# Patient Record
Sex: MALE | Race: BLACK OR AFRICAN AMERICAN | NOT HISPANIC OR LATINO | Employment: OTHER | URBAN - METROPOLITAN AREA
[De-identification: names, ages, dates, MRNs, and addresses within clinical notes are randomized per-mention and may not be internally consistent; named-entity substitution may affect disease eponyms.]

---

## 2022-05-31 NOTE — PROGRESS NOTES
Chief Complaint: No chief complaint on file.      HPI:  Patient is a 71-year-old male referred to urology clinic for hesitancy, dysuria, frequent UTIs.  Patient treated by [facility] urgent care for UTI with microscopic hematuria.  Today patient presents without dysuria, urinary urgency, frequency, incontinence, retention, gross hematuria, nocturia.  Patient is currently on a Flomax to 0.4 mg p.o. daily.  Patient denies family history of urologic pathology, personal history of stones.     Plan:  PSA now, and UA with reflex.  Instructed patient on timed voiding every 2-3 hours, double voiding, avoidance of bladder irritants such as alcohol citrus foods & drinks, chocolates, caffeinated drinks, energy drinks, spicey foods, sodas, increase water intake.    Allergies:  Review of patient's allergies indicates:  Not on File    Medications:  No current outpatient medications on file.     No current facility-administered medications for this visit.       Review of Systems:  General: No fever, chills, fatigability, or weight loss.  Skin: No rashes, itching, or changes in color or texture of skin.  Chest: Denies HAGAN, cyanosis, wheezing, cough, and sputum production.  Abdomen: Appetite fine. No weight loss. Denies diarrhea, abdominal pain, hematemesis, or blood in stool.  Musculoskeletal: No joint stiffness or swelling. Denies back pain.  : As above.  All other review of systems negative.    PMH:  No past medical history on file.    PSH:  No past surgical history on file.    FamHx:  No family history on file.    SocHx:       Physical Exam:  There were no vitals filed for this visit.  General: A&Ox3, no apparent distress, no deformities  Neck: No masses, normal thyroid  Lungs: CTA richy, no use of accessory muscles  Heart: RRR, no arrhythmias  Abdomen: Soft, NT, ND, no masses, no hernias, no hepatosplenomegaly  Lymphatic: Neck and groin nodes negative  Skin: The skin is warm and dry. No jaundice.  Ext: No c/c/e.    GUMale: Test  desc richy, no abnormalities of epididymus. Penis circumcised, with normal penile and scrotal skin. Meatus normal. Normal rectal tone, no hemorrhoids. Prostate 1-1/2 finger breath wide, smooth, soft, nontender, no nodules or masses appreciated. SV not palpable. Perineum and anus normal.          Impression:  BPH, frequent UTIs      Plan: PSA now, and UA with reflex.  Instructed patient on timed voiding every 2-3 hours, double voiding, avoidance of bladder irritants such as alcohol citrus foods & drinks, chocolates, caffeinated drinks, energy drinks, spicey foods, sodas, increase water intake.

## 2022-06-01 ENCOUNTER — OFFICE VISIT (OUTPATIENT)
Dept: UROLOGY | Facility: CLINIC | Age: 72
End: 2022-06-01
Payer: OTHER GOVERNMENT

## 2022-06-01 ENCOUNTER — LAB VISIT (OUTPATIENT)
Dept: LAB | Facility: HOSPITAL | Age: 72
End: 2022-06-01
Attending: UROLOGY
Payer: OTHER GOVERNMENT

## 2022-06-01 DIAGNOSIS — N40.0 BENIGN PROSTATIC HYPERPLASIA WITHOUT LOWER URINARY TRACT SYMPTOMS: ICD-10-CM

## 2022-06-01 DIAGNOSIS — N39.0 FREQUENT UTI: Primary | ICD-10-CM

## 2022-06-01 DIAGNOSIS — N39.0 FREQUENT UTI: ICD-10-CM

## 2022-06-01 LAB
APPEARANCE UR: CLEAR
BACTERIA #/AREA URNS AUTO: NORMAL /HPF
BILIRUB UR QL STRIP.AUTO: NEGATIVE MG/DL
COLOR UR AUTO: COLORLESS
FREE/TOTAL PSA (OHS): 20.3 %
GLUCOSE UR QL STRIP.AUTO: NORMAL MG/DL
HYALINE CASTS #/AREA URNS LPF: NORMAL /LPF
KETONES UR QL STRIP.AUTO: NEGATIVE MG/DL
LEUKOCYTE ESTERASE UR QL STRIP.AUTO: NEGATIVE UNIT/L
NITRITE UR QL STRIP.AUTO: NEGATIVE
PH UR STRIP.AUTO: 6 [PH]
PROT UR QL STRIP.AUTO: NEGATIVE MG/DL
PSA FREE MFR SERPL: 20 %
PSA FREE SERPL-MCNC: 0.62 NG/ML
PSA SERPL-MCNC: 3.06 NG/ML
RBC #/AREA URNS AUTO: NORMAL /HPF
RBC UR QL AUTO: NEGATIVE UNIT/L
SP GR UR STRIP.AUTO: 1
SQUAMOUS #/AREA URNS LPF: NORMAL /HPF
UROBILINOGEN UR STRIP-ACNC: NORMAL MG/DL
WBC #/AREA URNS AUTO: NORMAL /HPF

## 2022-06-01 PROCEDURE — 84153 ASSAY OF PSA TOTAL: CPT

## 2022-06-01 PROCEDURE — 99204 PR OFFICE/OUTPT VISIT, NEW, LEVL IV, 45-59 MIN: ICD-10-PCS | Mod: S$PBB,,, | Performed by: NURSE PRACTITIONER

## 2022-06-01 PROCEDURE — 99204 OFFICE O/P NEW MOD 45 MIN: CPT | Mod: S$PBB,,, | Performed by: NURSE PRACTITIONER

## 2022-06-01 PROCEDURE — 36415 COLL VENOUS BLD VENIPUNCTURE: CPT

## 2022-06-01 PROCEDURE — 99214 OFFICE O/P EST MOD 30 MIN: CPT | Mod: PBBFAC | Performed by: NURSE PRACTITIONER

## 2022-06-01 PROCEDURE — 84154 ASSAY OF PSA FREE: CPT

## 2022-06-01 PROCEDURE — 81001 URINALYSIS AUTO W/SCOPE: CPT | Performed by: NURSE PRACTITIONER

## 2022-06-01 RX ORDER — CIPROFLOXACIN 500 MG/1
500 TABLET ORAL 2 TIMES DAILY
COMMUNITY
Start: 2022-03-10 | End: 2022-12-07 | Stop reason: CLARIF

## 2022-06-01 RX ORDER — DOXYCYCLINE 100 MG/1
100 CAPSULE ORAL 2 TIMES DAILY
COMMUNITY
Start: 2022-01-27 | End: 2022-12-07 | Stop reason: CLARIF

## 2022-06-01 RX ORDER — METRONIDAZOLE 500 MG/1
2000 TABLET ORAL DAILY
COMMUNITY
Start: 2022-02-01

## 2022-06-01 NOTE — PROGRESS NOTES
Placed in room. Seen by Angel Cnon NP. Spoke with patient. U/A collected and sent to lab. Will obtain PSA, with clinic appointment in 6 months.

## 2022-12-07 ENCOUNTER — OFFICE VISIT (OUTPATIENT)
Dept: UROLOGY | Facility: CLINIC | Age: 72
End: 2022-12-07
Payer: OTHER GOVERNMENT

## 2022-12-07 VITALS
SYSTOLIC BLOOD PRESSURE: 161 MMHG | BODY MASS INDEX: 24.13 KG/M2 | TEMPERATURE: 98 F | HEART RATE: 48 BPM | RESPIRATION RATE: 20 BRPM | DIASTOLIC BLOOD PRESSURE: 65 MMHG | HEIGHT: 72 IN | OXYGEN SATURATION: 100 % | WEIGHT: 178.13 LBS

## 2022-12-07 DIAGNOSIS — N40.0 BENIGN PROSTATIC HYPERPLASIA WITHOUT LOWER URINARY TRACT SYMPTOMS: Primary | ICD-10-CM

## 2022-12-07 PROCEDURE — 99214 OFFICE O/P EST MOD 30 MIN: CPT | Mod: PBBFAC | Performed by: NURSE PRACTITIONER

## 2022-12-07 PROCEDURE — 99213 OFFICE O/P EST LOW 20 MIN: CPT | Mod: S$PBB,,, | Performed by: NURSE PRACTITIONER

## 2022-12-07 PROCEDURE — 99213 PR OFFICE/OUTPT VISIT, EST, LEVL III, 20-29 MIN: ICD-10-PCS | Mod: S$PBB,,, | Performed by: NURSE PRACTITIONER

## 2022-12-07 RX ORDER — AMLODIPINE BESYLATE 10 MG/1
10 TABLET ORAL
COMMUNITY
Start: 2022-08-23

## 2022-12-07 RX ORDER — PANTOPRAZOLE SODIUM 40 MG/1
40 TABLET, DELAYED RELEASE ORAL
COMMUNITY
Start: 2022-08-23

## 2022-12-07 RX ORDER — ASPIRIN 325 MG
1 TABLET, DELAYED RELEASE (ENTERIC COATED) ORAL WEEKLY
COMMUNITY
Start: 2022-08-23

## 2022-12-07 RX ORDER — LOSARTAN POTASSIUM 100 MG/1
100 TABLET ORAL
COMMUNITY
Start: 2022-08-23

## 2022-12-07 RX ORDER — TIZANIDINE 4 MG/1
4 TABLET ORAL
COMMUNITY
Start: 2022-08-23

## 2022-12-07 NOTE — PROGRESS NOTES
Chief Complaint:   Chief Complaint   Patient presents with    BPH- UTI's       HPI: Patient is a 72-year-old male 6 month F/U BPH, hesitancy, dysuria, frequent UTIs. Last PSA 3.06 on 06/01/2022 Today patient denies dysuria, urinary urgency, frequency, incontinence, retention, gross hematuria, nocturia.           Allergies:  Review of patient's allergies indicates:   Allergen Reactions    Pcn [penicillins]        Medications:  Current Outpatient Medications   Medication Sig Dispense Refill    amLODIPine (NORVASC) 10 MG tablet 10 mg.      cholecalciferol, vitamin D3, 1,250 mcg (50,000 unit) capsule Take 1 capsule by mouth once a week.      pantoprazole (PROTONIX) 40 MG tablet 40 mg.      tiZANidine (ZANAFLEX) 4 MG tablet 4 mg.      ciprofloxacin HCl (CIPRO) 500 MG tablet Take 500 mg by mouth 2 (two) times daily.      doxycycline (VIBRAMYCIN) 100 MG Cap Take 100 mg by mouth 2 (two) times daily.      losartan (COZAAR) 100 MG tablet 100 mg.      metroNIDAZOLE (FLAGYL) 500 MG tablet Take 2,000 mg by mouth once daily.       No current facility-administered medications for this visit.       Review of Systems:  General: No fever, chills, fatigability, or weight loss.  Skin: No rashes, itching, or changes in color or texture of skin.  Chest: Denies HAGAN, cyanosis, wheezing, cough, and sputum production.  Abdomen: Appetite fine. No weight loss. Denies diarrhea, abdominal pain, hematemesis, or blood in stool.  Musculoskeletal: No joint stiffness or swelling. Denies back pain.  : As above.  All other review of systems negative.    PMH:  Past Medical History:   Diagnosis Date    Dysuria        PSH:  Past Surgical History:   Procedure Laterality Date    hemorroidectomy      HERNIA REPAIR      KNEE SURGERY         FamHx:  No family history on file.    SocHx:  Social History     Socioeconomic History    Marital status:    Tobacco Use    Smoking status: Never     Passive exposure: Current    Smokeless tobacco: Never    Substance and Sexual Activity    Alcohol use: Never    Drug use: Yes     Types: Marijuana    Sexual activity: Yes       Physical Exam:  Vitals:    12/07/22 1010   BP: (!) 161/65   Pulse: (!) 48   Resp: 20   Temp: 97.9 °F (36.6 °C)     General: A&Ox3, no apparent distress, no deformities  Neck: No masses, normal thyroid  Lungs: CTA richy, no use of accessory muscles  Heart: RRR, no arrhythmias  Abdomen: Soft, NT, ND, no masses, no hernias, no hepatosplenomegaly  Lymphatic: Neck and groin nodes negative  Skin: The skin is warm and dry. No jaundice.  Ext: No c/c/e.              Impression: BPH      Plan: Instructed patient PSA now & with RTC in 6 months, Patient instructed on timed voiding every 2-3 hours, double voiding, avoidance of bladder irritants such as alcohol citrus foods & drinks, chocolates, caffeinated drinks, energy drinks, spicy.

## 2023-06-07 ENCOUNTER — OFFICE VISIT (OUTPATIENT)
Dept: UROLOGY | Facility: CLINIC | Age: 73
End: 2023-06-07
Payer: OTHER GOVERNMENT

## 2023-06-07 VITALS
DIASTOLIC BLOOD PRESSURE: 70 MMHG | SYSTOLIC BLOOD PRESSURE: 131 MMHG | BODY MASS INDEX: 23.14 KG/M2 | RESPIRATION RATE: 18 BRPM | HEART RATE: 76 BPM | OXYGEN SATURATION: 98 % | WEIGHT: 170.81 LBS | HEIGHT: 72 IN

## 2023-06-07 DIAGNOSIS — N40.0 BENIGN PROSTATIC HYPERPLASIA WITHOUT LOWER URINARY TRACT SYMPTOMS: Primary | ICD-10-CM

## 2023-06-07 PROCEDURE — 99213 PR OFFICE/OUTPT VISIT, EST, LEVL III, 20-29 MIN: ICD-10-PCS | Mod: S$PBB,,, | Performed by: NURSE PRACTITIONER

## 2023-06-07 PROCEDURE — 99213 OFFICE O/P EST LOW 20 MIN: CPT | Mod: S$PBB,,, | Performed by: NURSE PRACTITIONER

## 2023-06-07 PROCEDURE — 99214 OFFICE O/P EST MOD 30 MIN: CPT | Mod: PBBFAC | Performed by: NURSE PRACTITIONER

## 2023-06-07 NOTE — PROGRESS NOTES
Chief Complaint:   Chief Complaint   Patient presents with    Follow-up     6 month follow up with PSA. Last visit 06/01/2022 BPH, Frequent UTIs        HPI: Patient is a 72-year-old male 6 month F/U BPH, hesitancy, dysuria, frequent UTIs. Last PSA 2.9 on 12/07/2022Patient urine stream, hesitancy, straining, interruption of stream, dribbling, frequency, nocturia. SEGUNDO: As described below. Family history of urologic pathology & personal history of stones.       .   Allergies:  Review of patient's allergies indicates:   Allergen Reactions    Pcn [penicillins]        Medications:  Current Outpatient Medications   Medication Sig Dispense Refill    amLODIPine (NORVASC) 10 MG tablet 10 mg.      cholecalciferol, vitamin D3, 1,250 mcg (50,000 unit) capsule Take 1 capsule by mouth once a week.      losartan (COZAAR) 100 MG tablet 100 mg.      metroNIDAZOLE (FLAGYL) 500 MG tablet Take 2,000 mg by mouth once daily.      pantoprazole (PROTONIX) 40 MG tablet 40 mg.      tiZANidine (ZANAFLEX) 4 MG tablet 4 mg.       No current facility-administered medications for this visit.       Review of Systems:  General: No fever, chills, fatigability, or weight loss.  Skin: No rashes, itching, or changes in color or texture of skin.  Chest: Denies HAGAN, cyanosis, wheezing, cough, and sputum production.  Abdomen: Appetite fine. No weight loss. Denies diarrhea, abdominal pain, hematemesis, or blood in stool.  Musculoskeletal: No joint stiffness or swelling. Denies back pain.  : As above.  All other review of systems negative.    PMH:  Past Medical History:   Diagnosis Date    Dysuria        PSH:  Past Surgical History:   Procedure Laterality Date    hemorroidectomy      HERNIA REPAIR      KNEE SURGERY         FamHx:  No family history on file.    SocHx:  Social History     Socioeconomic History    Marital status:    Tobacco Use    Smoking status: Never     Passive exposure: Current    Smokeless tobacco: Never   Substance and Sexual  Activity    Alcohol use: Never    Drug use: Yes     Types: Marijuana    Sexual activity: Yes       Physical Exam:  Vitals:    06/07/23 1007   BP: 131/70   Pulse: 76   Resp: 18     General: A&Ox3, no apparent distress, no deformities  Neck: No masses, normal thyroid  Lungs: CTA richy, no use of accessory muscles  Heart: RRR, no arrhythmias  Abdomen: Soft, NT, ND, no masses, no hernias, no hepatosplenomegaly  Lymphatic: Neck and groin nodes negative  Skin: The skin is warm and dry. No jaundice.  Ext: No c/c/e.    GUMale: Test desc richy, no abnormalities of epididymus. Penis circumcised, with normal penile and scrotal skin. Meatus normal. Normal rectal tone, no hemorrhoids. Prostate: 2 finger breadth wide, 1/2 fingerbreadth thick, smooth, soft, nontender, no nodules or masses appreciated. SV not palpable. Perineum and anus normal.        Impression:  BPH      Plan:PSA now will notify patient of results, F/U 6 months with PSA

## 2023-06-07 NOTE — PROGRESS NOTES
Pt seen by Senia MAGANA. Pt will return to clinic 6 months with PSA. Orders for PSA to be done now will be placed by provider. Pt education given both written and verbal.

## 2023-06-13 DIAGNOSIS — N40.0 BENIGN PROSTATIC HYPERPLASIA WITHOUT LOWER URINARY TRACT SYMPTOMS: ICD-10-CM

## 2023-06-13 DIAGNOSIS — N39.0 FREQUENT UTI: Primary | ICD-10-CM

## 2023-07-07 DIAGNOSIS — N40.0 BENIGN PROSTATIC HYPERPLASIA WITHOUT LOWER URINARY TRACT SYMPTOMS: Primary | ICD-10-CM
